# Patient Record
Sex: MALE | Race: WHITE | Employment: UNEMPLOYED | URBAN - METROPOLITAN AREA
[De-identification: names, ages, dates, MRNs, and addresses within clinical notes are randomized per-mention and may not be internally consistent; named-entity substitution may affect disease eponyms.]

---

## 2017-06-16 ENCOUNTER — TELEPHONE (OUTPATIENT)
Dept: SURGERY | Age: 22
End: 2017-06-16

## 2018-08-20 ENCOUNTER — TELEPHONE (OUTPATIENT)
Dept: SURGERY | Age: 23
End: 2018-08-20

## 2018-08-20 NOTE — TELEPHONE ENCOUNTER
Pt left a mssg that he was in the ER over the weekend and a Pil Cyst was drained. He is asking for someone to call him to let him know the probability of this returning again. I did call the number back he left to ask him if he was at a [facility] facility in the ER and that he would be called back once the MA came in since he left a mssg on the service.

## 2018-08-20 NOTE — TELEPHONE ENCOUNTER
Spoke to patient. He has moved to [place]. Her just wanted to know if his pilonidal cyst could recur.  He will call his insurance to find a general surgeon where he lives

## 2019-05-31 ENCOUNTER — TELEPHONE (OUTPATIENT)
Dept: SURGERY | Age: 24
End: 2019-05-31

## 2019-06-04 NOTE — TELEPHONE ENCOUNTER
I tried both numbers again and neither number is a working number. I informed Kana Martinez if patient calls back, he needs appt to be seen. Kana Martinez states he informed her he was homeless, so I can not mail a letter to an unknown address.

## 2019-06-10 ENCOUNTER — TELEPHONE (OUTPATIENT)
Dept: SURGERY | Age: 24
End: 2019-06-10

## 2019-06-10 NOTE — TELEPHONE ENCOUNTER
Pt called to begin telling me symptoms of his current issues with a reoccurring pil cyst. I let him know our office has been trying to contact him to get him in to be seen. He let me know he was in [place] now and was having trouble getting in to a Dr there. He kept wanting advise on urgency of his new dx of AIDS, as he stated, and its affect of his pil cyst. Again, I advised him if he cant make it in to our office he should be seen by a surgeon there and or at least a PCP to get established. He was persistent that we advise him on what to do. I again offered an appt for further advise here if he was in town or to be seen in [place].